# Patient Record
Sex: FEMALE
[De-identification: names, ages, dates, MRNs, and addresses within clinical notes are randomized per-mention and may not be internally consistent; named-entity substitution may affect disease eponyms.]

---

## 2018-03-15 ENCOUNTER — RESULT REVIEW (OUTPATIENT)
Age: 38
End: 2018-03-15

## 2021-12-22 PROBLEM — Z00.00 ENCOUNTER FOR PREVENTIVE HEALTH EXAMINATION: Status: ACTIVE | Noted: 2021-12-22

## 2021-12-23 ENCOUNTER — APPOINTMENT (OUTPATIENT)
Dept: OBGYN | Facility: CLINIC | Age: 41
End: 2021-12-23
Payer: COMMERCIAL

## 2021-12-23 VITALS
HEIGHT: 63 IN | SYSTOLIC BLOOD PRESSURE: 110 MMHG | WEIGHT: 198 LBS | BODY MASS INDEX: 35.08 KG/M2 | DIASTOLIC BLOOD PRESSURE: 78 MMHG

## 2021-12-23 DIAGNOSIS — Z78.9 OTHER SPECIFIED HEALTH STATUS: ICD-10-CM

## 2021-12-23 DIAGNOSIS — Z12.31 ENCOUNTER FOR SCREENING MAMMOGRAM FOR MALIGNANT NEOPLASM OF BREAST: ICD-10-CM

## 2021-12-23 DIAGNOSIS — Z01.419 ENCOUNTER FOR GYNECOLOGICAL EXAMINATION (GENERAL) (ROUTINE) W/OUT ABNORMAL FINDINGS: ICD-10-CM

## 2021-12-23 PROCEDURE — 99386 PREV VISIT NEW AGE 40-64: CPT

## 2021-12-24 PROBLEM — Z78.9 SOCIAL ALCOHOL USE: Status: ACTIVE | Noted: 2021-12-23

## 2021-12-24 PROBLEM — Z78.9 EXERCISES DAILY: Status: ACTIVE | Noted: 2021-12-23

## 2021-12-24 RX ORDER — CHOLECALCIFEROL (VITAMIN D3) 1250 MCG
1.25 MG CAPSULE ORAL
Refills: 0 | Status: ACTIVE | COMMUNITY

## 2021-12-24 NOTE — HISTORY OF PRESENT ILLNESS
[N] : Patient does not use contraception [Y] : Positive pregnancy history [Menarche Age: ____] : age at menarche was [unfilled] [No] : Patient does not have concerns regarding sex [Currently Active] : currently active [Men] : men [Patient refuses STI testing] : Patient refuses STI testing [TextBox_4] : Pt presents for an Annual. [PapSmeardate] : 03/15/2018 [TextBox_31] : Negative [LMPDate] : 12/04/2021 [PGHxTotal] : 3 [[facility] : 3 [[facility] : 3 [FreeTextEntry1] : 12/04/2021

## 2021-12-24 NOTE — DISCUSSION/SUMMARY
[FreeTextEntry1] :   The benefits of adequate calcium intake and a daily multivitamin along with routine daily cardiovascular exercise were reviewed with the patient.\par   The patient was informed regarding the benefits of a screening colonoscopy.\par   The importance of safer-sex was discussed with the patient.\par We reviewed ASCCP/ACOG guidelines for pap smears.

## 2021-12-24 NOTE — PHYSICAL EXAM
[Chaperone Present] : A chaperone was present in the examining room during all aspects of the physical examination [FreeTextEntry1] : stevie walter [Appropriately responsive] : appropriately responsive [Alert] : alert [No Acute Distress] : no acute distress [No Lymphadenopathy] : no lymphadenopathy [Soft] : soft [Non-tender] : non-tender [Non-distended] : non-distended [Oriented x3] : oriented x3 [Examination Of The Breasts] : a normal appearance [No Discharge] : no discharge [No Masses] : no breast masses were palpable [Labia Majora] : normal [Labia Minora] : normal [No Bleeding] : There was no active vaginal bleeding [Normal] : normal [Normal Position] : in a normal position [Uterine Adnexae] : non-palpable

## 2021-12-26 LAB — HPV HIGH+LOW RISK DNA PNL CVX: NOT DETECTED

## 2021-12-31 LAB — CYTOLOGY CVX/VAG DOC THIN PREP: NORMAL
